# Patient Record
Sex: MALE | Race: WHITE | NOT HISPANIC OR LATINO | Employment: FULL TIME | URBAN - METROPOLITAN AREA
[De-identification: names, ages, dates, MRNs, and addresses within clinical notes are randomized per-mention and may not be internally consistent; named-entity substitution may affect disease eponyms.]

---

## 2023-12-06 ENCOUNTER — TREATMENT (OUTPATIENT)
Dept: PHYSICAL THERAPY | Facility: CLINIC | Age: 28
End: 2023-12-06
Payer: COMMERCIAL

## 2023-12-06 DIAGNOSIS — M99.02 SOMATIC DYSFUNCTION OF SCAPULOTHORACIC JOINT: Primary | ICD-10-CM

## 2023-12-06 DIAGNOSIS — M89.8X1 PAIN OF LEFT SCAPULA: ICD-10-CM

## 2023-12-06 DIAGNOSIS — G25.89 SCAPULAR DYSKINESIS: ICD-10-CM

## 2023-12-06 PROCEDURE — 97110 THERAPEUTIC EXERCISES: CPT | Performed by: PHYSICAL THERAPIST

## 2023-12-06 PROCEDURE — 97112 NEUROMUSCULAR REEDUCATION: CPT | Performed by: PHYSICAL THERAPIST

## 2023-12-06 PROCEDURE — 97161 PT EVAL LOW COMPLEX 20 MIN: CPT | Performed by: PHYSICAL THERAPIST

## 2023-12-06 NOTE — PROGRESS NOTES
"Physical Therapy Initial Evaluation and Plan of Care  [facility]  [address], [address]  [place] [address]    Patient: Graham Sawyer III   : 1995  Referring practitioner: King Bishop I*  Date of Initial Visit: 2023  Today's Date: 2023  Patient seen for 1 sessions      Diagnosis/ICD-10 Codes:      ICD-10-CM ICD-9-CM   1. Somatic dysfunction of scapulothoracic joint  M99.02 739.2   2. Pain of left scapula  M89.8X1 733.90   3. Scapular dyskinesis  G25.89 781.3                Subjective Questionnaire: Oswestry: 12%  Subjective Questionnaire: QuickDASH: 9%  Subjective Questionnaire: QuickDASH (Work): 6%    Subjective Evaluation    History of Present Illness  Date of onset: 2023  Mechanism of injury: Graham Sawyer III is a 28 year male who reports to clinic today with current complaints of pain in his left shoulder blade.  He reports a long history of this issue which started about 5 years ago and may be related to doing pull up and feeling a \"pop\" in his shoulder back then.  He notices the pain most with prolonged sitting.  He reports the pain feels like pulling or pinching.\"  He does note that he is a  and heavy lifting all day does not seem to bother him.  He has been referred to PT for Evaluate and treat; Stretching, ROM, Strengthening.    PROCEDURE:  XR scapula left 2 view 2023  IMPRESSION:  No acute bony abnormality of the left scapula        Patient Occupation:  Pain  Current pain ratin  At best pain ratin  At worst pain ratin  Location: medical L shoulder blade  Quality: pinching/pulling.  Alleviating factors: moving out of seated position.  Exacerbated by: sitting.  Progression: no change    Social Support  Lives in: one-story house  Lives with: spouse    Hand dominance: right    Diagnostic Tests  X-ray: normal    Treatments  Current treatment: physical therapy  Patient Goals  Patient goals for therapy: decreased " pain, increased strength and return to sport/leisure activities  Patient goal: be able to do workouts fully         Objective          Postural Observations  Seated posture: fair  Standing posture: good  Correction of posture: has no consistent effect      Palpation   Left   Tenderness of the levator scapulae, rhomboids and thoracic paraspinals.     Tenderness     Left Shoulder   Tenderness in the medial scapula.     Additional Tenderness Details        Cervical/Thoracic Screen   Cervical range of motion within normal limits  Thoracic range of motion within normal limits    Active Range of Motion   Left Shoulder   Normal active range of motion  External rotation BTH: T4     Right Shoulder   Normal active range of motion  External rotation BTH: T4     Scapular Mobility   Left Shoulder   Scapular mobility: WFL  Scapular Mobility with Shoulder to 90° FF   Scapular winging: minimal    Scapular Mobility beyond 90° FF   Scapular winging: minimal    Strength/Myotome Testing     Left Shoulder   Normal muscle strength      Access Code: [email or web address]  URL: [email or web address]  Date: 12/06/2023  Prepared by: Charles Chong    Exercises  - Sidelying Thoracic Rotation with Open Book  - 1 x daily - 7 x weekly - 2 sets - 10 reps  - Prone Shoulder Row on Swiss Ball with Dumbbells  - 1 x daily - 7 x weekly - 2 sets - 10 reps  - Prone Shoulder Extension on Swiss Ball with Dumbbells  - 1 x daily - 7 x weekly - 2 sets - 10 reps  - Prone Middle Trapezius Strengthening on Swiss Ball with Dumbbells  - 1 x daily - 7 x weekly - 2 sets - 10 reps  - Prone Lower Trapezius Strengthening on Swiss Ball with Dumbbells  - 1 x daily - 7 x weekly - 2 sets - 10 reps  - Standing Anti-Rotation Press with Anchored Resistance  - 1 x daily - 7 x weekly - 1 sets - 10 reps  - Standing Wall Ball Circles with Mini Swiss Ball  - 1 x daily - 7 x weekly - 1 sets - 10 reps  - Seated Thoracic Extension Arms Overhead  - 1 x daily - 7 x weekly - 1 sets - 10  reps    Assessment & Plan       Assessment  Impairments: activity intolerance, lacks appropriate home exercise program and pain with function   Functional limitations: sitting   Assessment details: 28 year old male who presents with the impairments noted above secondary to left shoulder blade/thoracic pain exacerbated by sitting..  These impairments decrease his ability and tolerance to perform his normal daily activities.  This patient presents with a level of complexity and his condition is such that the services required can be safely and effectively performed only by a qualified PT or supervised PTA.     Prognosis: good    Goals  Plan Goals: STG (4 weeks)  1) Independent in home program for basic shoulder and thoracic range of motion  2) Demonstrates basic understanding of condition and their role in treatment progression  3) Tolerates initiation of shoulder strengthening, scapular stabilization and thoracic stabilization.  4) Demonstrates slight improvement in tolerance to daily activities as evidenced by QuickDash score of 7% or less.  5) Demonstrates moderate improvement in tolerance to daily activity as evidenced by Oswestry disability score of 8% or less      LTG (8 Weeks)  1) Independent in home program for self-management of his condition  2) Demonstrates significant improvement in his tolerance to daily activities as evidenced by QuickDash score of 5% or less.  3) Reports ability to return to sit for 30 minutes or more without difficulty or significant shoulder pain.  4) Demonstrates substantial improvement in tolerance to daily activity as evidenced by Oswestry disability score of 5% or less  5) Demonstrates good posture in sitting to improve tolerance to that position.    Plan  Planned modality interventions: cryotherapy, thermotherapy (hydrocollator packs) and ultrasound  Planned therapy interventions: abdominal trunk stabilization, body mechanics training, flexibility, functional ROM exercises, home  exercise program, IADL retraining, strengthening, stretching, spinal/joint mobilization, therapeutic activities, soft tissue mobilization, postural training, neuromuscular re-education and manual therapy  Frequency: 2x week  Duration in weeks: 8  Treatment plan discussed with: patient  LOW (1-2)    History # of Personal Factors and/or Comorbidities: LOW (0)  Examination of Body System(s): # of elements:   Clinical Presentation: STABLE   Clinical Decision Making: LOW     Timed:         Manual Therapy:         mins  18050;     Therapeutic Exercise:    15     mins  34340;     Neuromuscular Rachel:    10    mins  02747;    Therapeutic Activity:          mins  70701;     Gait Training:           mins  29118;     Ultrasound:          mins  99604;    Ionto                                   mins   30506  Self Care                            mins   65646      Un-Timed:  Electrical Stimulation:         mins  02066 ( );  Canalith Repos         mins 57706  Traction          mins 10110  Dry Needle                 ______ mins DRYNDL  Low Eval     30     Mins  74776  Mod Eval          Mins  24737  High Eval                            Mins  52136  Re-Eval                               mins  24916        Timed Treatment:   25   mins   Total Treatment:     55   mins    PT SIGNATURE: Emilio Chong, JOSETTE   DATE TREATMENT INITIATED: 12/6/2023    Initial Certification  Certification Period: 12/6/2023 through 3/5/2024  I certify that the therapy services are furnished while this patient is under my care.  The services outlined above are required by this patient, and will be reviewed every 90 days.     PHYSICIAN: King Bishop II, DO      DATE:     Please sign and return via fax to [phone]. Thank you, [facility].

## 2023-12-12 ENCOUNTER — TELEPHONE (OUTPATIENT)
Dept: PHYSICAL THERAPY | Facility: CLINIC | Age: 28
End: 2023-12-12